# Patient Record
Sex: MALE | Race: ASIAN
[De-identification: names, ages, dates, MRNs, and addresses within clinical notes are randomized per-mention and may not be internally consistent; named-entity substitution may affect disease eponyms.]

---

## 2019-03-04 ENCOUNTER — HOSPITAL ENCOUNTER (INPATIENT)
Dept: HOSPITAL 89 - OR | Age: 39
LOS: 1 days | Discharge: HOME | DRG: 460 | End: 2019-03-05
Attending: ORTHOPAEDIC SURGERY | Admitting: ORTHOPAEDIC SURGERY
Payer: OTHER GOVERNMENT

## 2019-03-04 VITALS — DIASTOLIC BLOOD PRESSURE: 64 MMHG | SYSTOLIC BLOOD PRESSURE: 108 MMHG

## 2019-03-04 VITALS — SYSTOLIC BLOOD PRESSURE: 127 MMHG | DIASTOLIC BLOOD PRESSURE: 79 MMHG

## 2019-03-04 VITALS — SYSTOLIC BLOOD PRESSURE: 96 MMHG | DIASTOLIC BLOOD PRESSURE: 68 MMHG

## 2019-03-04 VITALS
BODY MASS INDEX: 34.37 KG/M2 | WEIGHT: 219 LBS | BODY MASS INDEX: 34.37 KG/M2 | WEIGHT: 219 LBS | HEIGHT: 67 IN | HEIGHT: 67 IN

## 2019-03-04 VITALS — SYSTOLIC BLOOD PRESSURE: 115 MMHG | DIASTOLIC BLOOD PRESSURE: 74 MMHG

## 2019-03-04 VITALS — SYSTOLIC BLOOD PRESSURE: 115 MMHG | DIASTOLIC BLOOD PRESSURE: 73 MMHG

## 2019-03-04 VITALS — SYSTOLIC BLOOD PRESSURE: 111 MMHG | DIASTOLIC BLOOD PRESSURE: 77 MMHG

## 2019-03-04 VITALS — DIASTOLIC BLOOD PRESSURE: 62 MMHG | SYSTOLIC BLOOD PRESSURE: 100 MMHG

## 2019-03-04 VITALS — DIASTOLIC BLOOD PRESSURE: 83 MMHG | SYSTOLIC BLOOD PRESSURE: 126 MMHG

## 2019-03-04 VITALS — DIASTOLIC BLOOD PRESSURE: 62 MMHG | SYSTOLIC BLOOD PRESSURE: 109 MMHG

## 2019-03-04 VITALS — DIASTOLIC BLOOD PRESSURE: 86 MMHG | SYSTOLIC BLOOD PRESSURE: 131 MMHG

## 2019-03-04 VITALS — SYSTOLIC BLOOD PRESSURE: 118 MMHG | DIASTOLIC BLOOD PRESSURE: 83 MMHG

## 2019-03-04 DIAGNOSIS — M43.17: Primary | ICD-10-CM

## 2019-03-04 DIAGNOSIS — Z87.891: ICD-10-CM

## 2019-03-04 PROCEDURE — 36415 COLL VENOUS BLD VENIPUNCTURE: CPT

## 2019-03-04 PROCEDURE — 95940 IONM IN OPERATNG ROOM 15 MIN: CPT

## 2019-03-04 PROCEDURE — 86900 BLOOD TYPING SEROLOGIC ABO: CPT

## 2019-03-04 PROCEDURE — 86850 RBC ANTIBODY SCREEN: CPT

## 2019-03-04 PROCEDURE — 0SG30A0 FUSION OF LUMBOSACRAL JOINT WITH INTERBODY FUSION DEVICE, ANTERIOR APPROACH, ANTERIOR COLUMN, OPEN APPROACH: ICD-10-PCS | Performed by: ORTHOPAEDIC SURGERY

## 2019-03-04 PROCEDURE — 97161 PT EVAL LOW COMPLEX 20 MIN: CPT

## 2019-03-04 PROCEDURE — 86901 BLOOD TYPING SEROLOGIC RH(D): CPT

## 2019-03-04 PROCEDURE — 76000 FLUOROSCOPY <1 HR PHYS/QHP: CPT

## 2019-03-04 PROCEDURE — 0ST40ZZ RESECTION OF LUMBOSACRAL DISC, OPEN APPROACH: ICD-10-PCS | Performed by: ORTHOPAEDIC SURGERY

## 2019-03-04 RX ADMIN — HYDROCODONE BITARTRATE AND ACETAMINOPHEN PRN EACH: 5; 325 TABLET ORAL at 20:28

## 2019-03-04 RX ADMIN — DOCUSATE SODIUM SCH MG: 100 CAPSULE, LIQUID FILLED ORAL at 20:27

## 2019-03-04 RX ADMIN — CEFAZOLIN SODIUM SCH MLS/HR: 2 SOLUTION INTRAVENOUS at 20:27

## 2019-03-04 NOTE — HOSPITALIST CONSULTATION
History of Present Illness


Requesting Physician


Dr. Amaya


Reason for Consult


Medical Management


Chief Complaint


s/p lumbar fusion


History of Present Illness


He was admitted s/p lumbar fusion. It is reported the surgery went well and 


without complication.





History


Home Meds


No Active Prescriptions or Reported Meds


Allergies:  


Coded Allergies:  


     No Known Drug Allergies (Unverified , 2/25/19)


Patient History:  


Diabetes mellitus (DM)


  GRANDMOTHER


FH: HTN (hypertension)


  GRANDMOTHER


FH: MI (myocardial infarction)


  GRANDMOTHER


FH: stroke


  BROTHER OR SISTER


Hx Smoking:  Yes (SOCIAL SMOKER, QUIT 2011)


Smoking Status:  Former Smoker


When Quit Tobacco?:  2011


Caffeine/Cups Per Day:  NONE


Hx Alcohol Use:  Yes


Hx Substance Use Disorder:  No


Social Drug Use:  Never





Review of Systems


All Systems Reviewed/Normal:  Yes, Except as Noted





Exam


Vital Signs





Vital Signs








  Date Time  Temp Pulse Resp B/P (MAP) Pulse Ox O2 Delivery O2 Flow Rate FiO2


 


3/4/19 15:30  82 12  93   


 


3/4/19 09:23 97.0   127/79 (95)  Room Air  








General Appearance:  Alert, Awake, No Acute Distress, Afebrile


Neuro:  No Gross deficits


Cardiovascular:  Regular Rate and Rhythm


Respiratory:  No Respiratory Distress, Clear to Auscultation


GI:  Abd Soft and Non-Tender


Psych:  Alert & Oriented X3, Appropriate Mood & Affect





Assessment and Plan


Problems:  


(1) S/P lumbar fusion


Status:  Acute


Assessment & Plan:  Followed by Dr. Amaya. He has no medical problems or 


medications. Hospitalist service will follow along for any medical needs. 








Venous Thromboembolism


Antithrombotics


Is Pt On Any Antithrombotics?:  No





Prophylaxis Tx Contraindicated


Pharmacological Contraindicati:  Surgical Contraindication











LUIS BAR [facility]             Mar 4, 2019 15:49

## 2019-03-04 NOTE — OPERATIVE REPORT 1
EVENT DATE: March 4, 2019

SURGEON: Jadon Amaya MD 

ANESTHESIOLOGIST: Alpesh Mejía MD 

ANESTHESIA: General endotracheal.

APPROACH SURGEON:  Dr. Nicolas

ASSISTANT:  [facility]  





PREOPERATIVE DIAGNOSIS  

L5-S1 isthmic spondylolisthesis with severe low back pain.



POSTOPERATIVE DIAGNOSIS 

L5-S1 isthmic spondylolisthesis with severe low back pain.



PROCEDURE PERFORMED 

L5-S1 anterior lumbar interbody fusion.



IV FLUIDS

1700 cc's.



ESTIMATED BLOOD LOSS 

40 cc's. 



IMPLANTS

Size regular 12 mm high, 12-degree lordotic titanium interbody spacer from [employer] and 25 mm fixation screws from [employer] x3 plus a 10 cc ViBone.



SPECIMENS

None.



DRAINS

None.



COMPLICATIONS 

None.



DISPOSITION  

Post-anesthesia care unit.



INDICATIONS FOR SURGERY

Mr. Dueñas is a 38-year old male who presented with a complaint of low back 

pain.  He has had symptoms for the last 20+ years which waxed and waned but more

recently became severe about a year ago and failed to improve with physical 

therapy medication, activity modification, etc.  The physical examination was 

normal with no strength or sensory deficits.  He had increased pain with 

extension of the thoracolumbar spine.  His light touch sensation was intact and 

deep tendon reflexes were 2+ in bilateral patellar tendons and 1+ in bilateral 

Achilles tendons.  Imaging studies showed disk height loss and anterolisthesis 

of L5 on S1 with bilateral pars interarticularis defects at L5.  The MRI showed 

well-preserved disks at every level except for L5-S1, where there was disk 

desiccation and disk height loss.  Secondary to ongoing pain and failure to 

improve with nonsurgical care, we elected to send Mr. Dueñas for bilateral 

pars interarticularis injections.  These injections ultimately gave him 

excellent relief of his back pain but only for a brief period of time.  

Secondary to failure of nonsurgical care, he was offered and elected to undergo 

L5-S1 anterior lumbar interbody fusion. 



Prior to surgery, I explained in detail to the patient the possible risks of 

surgery.  These risks include bleeding, infection, damage to bowel, damage to 

the great vessels, spinal fluid leak, nerve root injury, persistent and/or 

worsening grade, retrograde ejaculation and other sexual dysfunction, death, 

blindness, autonomic nervous system dysfunction and other unforeseen medical and

surgical complications.  An understanding that in general spinal surgery is more

predictive at improving extremity discomfort than axial spine pain was stressed.





DESCRIPTION OF PROCEDURE 

On the date of surgery, the patient was met in the preoperative hold area and 

all questions were answered.  The operative site was identified and marked by 

myself.  The patient was then brought in good condition to the operating room 

and after succumbing to anesthesia was positioned in the supine position on a 

flat Royce table.  All bony protuberances and soft tissues were well padded in

the standard fashion.  Preoperative antibiotics were administered according to 

the appropriate timing schedule. Care was taken to maintain appropriate 

perfusion pressures during anesthesia.  At the conclusion of the procedure, 

sponge and needle counts were correct x2. 



A final time-out was undertaken by members of the operating team to confirm 

correct patient, correct levels and correct surgery.  The patient was then 

prepped and draped in the standard sterile fashion and Dr. Nicolas performed a

standard anterior retroperitoneal approach to the L5-S1 level.  Please refer to 

his dictation for a detailed description of that portion of the procedure. 



Once the disk space was exposed and we ensured we were at the correct level with

a lateral radiograph, I used a knife to perform an anterior annulotomy of the 

L5-S1 disk.  A sharp Gomez elevator was then used to elevate the disk off the 

endplates, ensuring to strip all cartilaginous remnants from the endplates of L5

and S1.  The majority of the disk was then removed on block and I then used a 

variety of curettes to further smooth and strip the vertebral endplates of any 

cartilaginous remnant.  I ensured we had good bleeding bone across both surfaces

and then we used the combination rasp trial device 13 mm in height and inserted 

it in the disk space.  AP and lateral radiographs showed it to be the right 

size, the right lordosis and in good position.  It was, therefore, withdrawn and

the 13 mm, 12-degree lordotic size regular titanium implant from [employer] was

selected.  We packed this with ViBone and then impacted it into place in the L5-

S1 interspace.  Under fluoroscopic guidance, we then used the awl to penetrate 

the endplates through the integrated holes in the device and ultimately placed 

one screw down into the S1 vertebral body and two screws up into the L5 

vertebral body.  Final radiographs were obtained that showed the device to be in

excellent position both in the AP and lateral views.  The wound was then 

irrigated with sterile saline solution.  Retractors were withdrawn and the wound

was closed in layers using a running Maxon for the linea alba and then an 

interrupted inverted suture for the subcutaneous tissue and then a running 

subcuticular skin stitch.  Sponge and needle counts were correct x2. 



POSTOPERATIVE CARE PLAN

The patient will remain in the hospital until he meets discharge criteria.  He 

will follow up in my clinic in two weeks for a wound check and examination.

[*]

KEELYD

## 2019-03-04 NOTE — RADIOLOGY IMAGING REPORT
FACILITY: [facility] [place] 

 

PATIENT NAME: Jl Dueñas

: 1980

MR: [id number]

V: [id number]

EXAM DATE: 

ORDERING PHYSICIAN: GLENYS ESCOBAR

TECHNOLOGIST: 

 

Location: [facility] [place]

Patient: Jl Dueñas

: 1980

MRN: [id number]

Visit/Account:[id number]

Date of Sevice:  3/04/2019

 

ACCESSION #: [id number]

 

Technique: C-ARM FLUORO 1 HR

 

HISTORY: L5-S1 FUSION

 

Comparison studies:  None

 

FINDINGS: Operative imaging was obtained of the lower lumbar spine.  An interbody spacer is noted at 
L5-S1.

 

Air Kerma: 11.3 mGy

 

IMPRESSION:

1.  Operative imaging as above. Please see procedural report for complete details.

 

Report Dictated By: Kaden Mistry DO at 3/4/2019 1:58 PM

 

Report E-Signed By: Kaden Mistry DO  at 3/4/2019 2:02 PM

 

WSN:[id number]

## 2019-03-05 VITALS — SYSTOLIC BLOOD PRESSURE: 122 MMHG | DIASTOLIC BLOOD PRESSURE: 76 MMHG

## 2019-03-05 VITALS — SYSTOLIC BLOOD PRESSURE: 113 MMHG | DIASTOLIC BLOOD PRESSURE: 70 MMHG

## 2019-03-05 RX ADMIN — HYDROCODONE BITARTRATE AND ACETAMINOPHEN PRN EACH: 5; 325 TABLET ORAL at 01:11

## 2019-03-05 RX ADMIN — HYDROCODONE BITARTRATE AND ACETAMINOPHEN PRN EACH: 5; 325 TABLET ORAL at 06:02

## 2019-03-05 RX ADMIN — CEFAZOLIN SODIUM SCH MLS/HR: 2 SOLUTION INTRAVENOUS at 03:47

## 2019-03-05 RX ADMIN — HYDROCODONE BITARTRATE AND ACETAMINOPHEN PRN EACH: 5; 325 TABLET ORAL at 08:06

## 2019-03-05 RX ADMIN — DOCUSATE SODIUM SCH MG: 100 CAPSULE, LIQUID FILLED ORAL at 08:06

## 2019-03-05 NOTE — HOSPITALIST PROGRESS NOTE
Subjective


Progress Notes


Subjective


He was admitted after lumbar fusion. He has no complaints this morning. He had 


no acute events overnight.





Patient Complains of:


Cardiovascular:  No: Chest Pain


Respiratory:  No: Shortness of Breath





Physical Exam





Vital Signs








  Date Time  Temp Pulse Resp B/P (MAP) Pulse Ox O2 Delivery O2 Flow Rate FiO2


 


3/5/19 08:16     86   


 


3/5/19 08:16      Room Air  


 


3/5/19 07:45 98.7 92 16 122/76 (91)   1.0 














Intake and Output 


 


 3/5/19





 00:00


 


Intake Total 4300 ml


 


Balance 4300 ml


 


 


 


Intake Oral 300 ml


 


IV Total 2000 ml


 


Other 2000 ml


 


# Voids 2








General Appearance:  Alert, Awake, No Acute Distress, Afebrile


Neuro:  No Gross deficits


Cardiovascular:  Regular Rate and Rhythm


Respiratory:  No Respiratory Distress, Clear to Auscultation


GI:  Soft and Non-Tender


Psych:  Alert & Oriented X3, Appropriate Mood & Affect





Assessment and Plan


Problems:  


(1) S/P lumbar fusion


Status:  Acute


Assessment & Plan:  Followed by Dr. Amaya. He has no medical problems or 


medications. Hospitalist service will follow along for any medical needs. 








Exam


Sepsis Risk:  No Definite Risk











LUIS BAR FNP             Mar 5, 2019 11:06

## 2019-03-05 NOTE — NUR
Physical Therapy Impression



PT eval completed and pt able to meet all goals with initial visit.  Plan

to d/c home with no needs and follow up with surgeon as scheduled.





Physical Therapy Goals



PT goals met with initial visit; no further visits planned.

1. Pt to be modified indep with bed mobility and sup<>sit

2. Pt to be modified indep with sit to/from stand

3. Pt to tolerate ambulation x 150' with least restrictive device and sats

in safe range on room air.

4. Pt to complete up/down platform step x 2 reps with modified indep



Patient's Goals